# Patient Record
Sex: FEMALE | Race: WHITE | Employment: PART TIME | URBAN - METROPOLITAN AREA
[De-identification: names, ages, dates, MRNs, and addresses within clinical notes are randomized per-mention and may not be internally consistent; named-entity substitution may affect disease eponyms.]

---
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
## 2018-05-31 ENCOUNTER — OFFICE VISIT (OUTPATIENT)
Dept: FAMILY MEDICINE | Facility: CLINIC | Age: 18
End: 2018-05-31
Payer: COMMERCIAL

## 2018-05-31 VITALS — DIASTOLIC BLOOD PRESSURE: 66 MMHG | OXYGEN SATURATION: 98 % | HEART RATE: 56 BPM | SYSTOLIC BLOOD PRESSURE: 101 MMHG

## 2018-05-31 DIAGNOSIS — D22.62 MELANOCYTIC NEVUS OF LEFT UPPER EXTREMITY: ICD-10-CM

## 2018-05-31 DIAGNOSIS — B35.4 TINEA CORPORIS: Primary | ICD-10-CM

## 2018-05-31 PROCEDURE — 99214 OFFICE O/P EST MOD 30 MIN: CPT | Performed by: FAMILY MEDICINE

## 2018-05-31 NOTE — MR AVS SNAPSHOT
"              After Visit Summary   5/31/2018    Kelly Beckham    MRN: [id number]           Patient Information     Date Of Birth          2000        Visit Information        Provider Department      5/31/2018 10:00 AM Barbara Person MD [facility]        Today's Diagnoses     Tinea corporis    -  1    Melanocytic nevus of left upper extremity          Care Instructions    FUTURE APPOINTMENTS  Follow up as needed in 2 week(s) if the rash is not improving or if it is worsening.    TOPICAL ANTIFUNGAL  Apply OTC terbinafine (Lamisil) 1% cream generously (half an inch beyond the borders) two time(s) a day for 3 weeks to the affected areas.          Follow-ups after your visit        Who to contact     If you have questions or need follow up information about today's clinic visit or your schedule please contact [facility] directly at [phone].  Normal or non-critical lab and imaging results will be communicated to you by MyChart, letter or phone within 4 business days after the clinic has received the results. If you do not hear from us within 7 days, please contact the clinic through MyChart or phone. If you have a critical or abnormal lab result, we will notify you by phone as soon as possible.  Submit refill requests through [email or web address] or call your pharmacy and they will forward the refill request to us. Please allow 3 business days for your refill to be completed.          Additional Information About Your Visit        MyChart Information     [email or web address] lets you send messages to your doctor, view your test results, renew your prescriptions, schedule appointments and more. To sign up, go to [email or web address] . Click on \"Log in\" on the left side of the screen, which will take you to the Welcome page. Then click on \"Sign up Now\" on the right side of the page.     You will be asked to enter the access code listed below, as well as some personal information. " Please follow the directions to create your username and password.     Your access code is: [email or web address]  Expires: 2018 10:08 AM     Your access code will  in 90 days. If you need help or a new code, please call your [place] clinic or [phone].        Care EveryWhere ID     This is your Care EveryWhere ID. This could be used by other organizations to access your [facility] [place] medical records  [id number]        Your Vitals Were     Pulse Pulse Oximetry                56 98%           Blood Pressure from Last 3 Encounters:   18 101/66    Weight from Last 3 Encounters:   No data found for Wt              Today, you had the following     No orders found for display       Primary Care Provider Office Phone # Fax #    [facility] [phone]       [address] [place] [address]        Equal Access to Services     DEIDRA WALLACE : Hadii aad ku hadasho Soomaali, waaxda luqadaha, qaybta kaalmada adeegyada, kristal marcus hayaan geovanna simmons . So [facility] [phone].    ATENCIÓN: Si habla español, tiene a weiss disposición servicios gratuitos de asistencia lingüística. Llame al [phone].    We comply with applicable federal civil rights laws and [place] laws. We do not discriminate on the basis of race, color, national origin, age, disability, sex, sexual orientation, or gender identity.            Thank you!     Thank you for choosing [facility]  for your care. Our goal is always to provide you with excellent care. Hearing back from our patients is one way we can continue to improve our services. Please take a few minutes to complete the written survey that you may receive in the mail after your visit with us. Thank you!             Your Updated Medication List - Protect others around you: Learn how to safely use, store and throw away your medicines at www.disposemymeds.org.      Notice  As of 2018 10:08 AM    You have not been prescribed any  medications.

## 2018-05-31 NOTE — LETTER
5/31/2018         RE: Kelly Beckham  [address] [address]        Dear Colleague,    Thank you for referring your [facility], [facility]ham[facility]. Please see a copy of my visit note below.    [facility] - PRIMARY CARE SKIN    CC : Rash   SUBJECTIVE:   Kelly Beckham is a 17 year old female who presents to clinic today because of a(n) rash beginning May 15 in the left armpit. She denies any other areas of involvement.    Pruritic : YES.  Symptoms appear to be : stable.  Aggravating factors : dry skin exacerbates itchy sensations.  Relieving factors : none identified.    Previous similar history : No.  Recent exposure history : new skincare products - deodorant  Any other family members with similar symptoms : No.    Therapies tried : None.  Products used : She has used a new deodorant prior to the onset of rash.    Issue Two : Mole on left upper arm.  Onset : present since childhood.    Personal Medical History  Skin Cancer : NO  Eczema Psoriasis Rosacea Autoimmune   NO NO NO NO     Family Medical History  Skin Cancer : NO  Eczema Psoriasis Rosacea Autoimmune   NO NO NO NO     Occupation : student (indoor).    Patient Active Problem List   Diagnosis     Low back pain       No past medical history on file. No past surgical history on file.   Social History   Substance Use Topics     Smoking status: Never Smoker     Smokeless tobacco: Never Used     Alcohol use Not on file             No Known Allergies     INTEGUMENTARY/SKIN: POSITIVE for mole changes and rash    ROS : 14 point review of systems was negative except the symptoms listed above in the HPI.    This document serves as a record of the services and decisions personally performed and made by Danna Person MD. It was created on her behalf by Austin Akhtar, a trained medical scribe.  The creation of this document is based on the scribe's personal observations and the provider's statements to the  medical scribe.  Austin Akhtar, May 31, 2018 9:59 AM      OBJECTIVE:   GENERAL: healthy, alert and no distress  SKIN: Deluna Skin Type - II.  Trunk and Arms were examined. The dermatoscope was used to help evaluate pigmented lesions.  Skin Pertinent Findings:  Left axilla : 4 cm x 8 mm linear erythematous patch  Left medial upper arm : 3 mm in size light brown macule(s) most consistent with benign nevus(i) with symmetrical central hypopigmentation.    Diagnostic Test Results:  none     MDM : based on the clinical appearance this looks most consistent with fungal etiology , however cannot rule out an irritant dermatitis secondary to the new deodorant.If antifungal does not clear the rash then consider desonide 0.05% cream .      ASSESSMENT:     Encounter Diagnoses   Name Primary?     Tinea corporis Yes     Melanocytic nevus of left upper extremity          PLAN:   Patient Instructions   FUTURE APPOINTMENTS  Follow up as needed in 2 week(s) if the rash is not improving or if it is worsening.    TOPICAL ANTIFUNGAL  Apply OTC terbinafine (Lamisil) 1% cream generously (half an inch beyond the borders) two time(s) a day for 3 weeks to the affected areas.        PROCEDURES:   None.    TT : 20 minutes.  CT : 15 minutes.      The information in this document, created by the medical scribe for me, accurately reflects the services I personally performed and the decisions made by me. I have reviewed and approved this document for accuracy prior to leaving the patient care area.  Danna Person MD May 31, 2018 9:59 AM  [facility]    Again, thank you for allowing me to participate in the care of your patient.        Sincerely,        Barbara Person MD

## 2018-05-31 NOTE — PATIENT INSTRUCTIONS
FUTURE APPOINTMENTS  Follow up as needed in 2 week(s) if the rash is not improving or if it is worsening.    TOPICAL ANTIFUNGAL  Apply OTC terbinafine (Lamisil) 1% cream generously (half an inch beyond the borders) two time(s) a day for 3 weeks to the affected areas.

## 2018-05-31 NOTE — PROGRESS NOTES
[facility] - PRIMARY CARE SKIN    CC : Rash   SUBJECTIVE:   Kelly Beckham is a 17 year old female who presents to clinic today because of a(n) rash beginning May 15 in the left armpit. She denies any other areas of involvement.    Pruritic : YES.  Symptoms appear to be : stable.  Aggravating factors : dry skin exacerbates itchy sensations.  Relieving factors : none identified.    Previous similar history : No.  Recent exposure history : new skincare products - deodorant  Any other family members with similar symptoms : No.    Therapies tried : None.  Products used : She has used a new deodorant prior to the onset of rash.    Issue Two : Mole on left upper arm.  Onset : present since childhood.    Personal Medical History  Skin Cancer : NO  Eczema Psoriasis Rosacea Autoimmune   NO NO NO NO     Family Medical History  Skin Cancer : NO  Eczema Psoriasis Rosacea Autoimmune   NO NO NO NO     Occupation : student (indoor).    Patient Active Problem List   Diagnosis     Low back pain       No past medical history on file. No past surgical history on file.   Social History   Substance Use Topics     Smoking status: Never Smoker     Smokeless tobacco: Never Used     Alcohol use Not on file             No Known Allergies     INTEGUMENTARY/SKIN: POSITIVE for mole changes and rash    ROS : 14 point review of systems was negative except the symptoms listed above in the HPI.    This document serves as a record of the services and decisions personally performed and made by Danna Person MD. It was created on her behalf by Austin Akhtar, a trained medical scribe.  The creation of this document is based on the scribe's personal observations and the provider's statements to the medical scribe.  Austin Akhtar, May 31, 2018 9:59 AM      OBJECTIVE:   GENERAL: healthy, alert and no distress  SKIN: Deluna Skin Type - II.  Trunk and Arms were examined. The dermatoscope was used to help evaluate pigmented lesions.  Skin Pertinent  Findings:  Left axilla : 4 cm x 8 mm linear erythematous patch  Left medial upper arm : 3 mm in size light brown macule(s) most consistent with benign nevus(i) with symmetrical central hypopigmentation.    Diagnostic Test Results:  none     MDM : based on the clinical appearance this looks most consistent with fungal etiology , however cannot rule out an irritant dermatitis secondary to the new deodorant.If antifungal does not clear the rash then consider desonide 0.05% cream .      ASSESSMENT:     Encounter Diagnoses   Name Primary?     Tinea corporis Yes     Melanocytic nevus of left upper extremity          PLAN:   Patient Instructions   FUTURE APPOINTMENTS  Follow up as needed in 2 week(s) if the rash is not improving or if it is worsening.    TOPICAL ANTIFUNGAL  Apply OTC terbinafine (Lamisil) 1% cream generously (half an inch beyond the borders) two time(s) a day for 3 weeks to the affected areas.        PROCEDURES:   None.    TT : 20 minutes.  CT : 15 minutes.      The information in this document, created by the medical scribe for me, accurately reflects the services I personally performed and the decisions made by me. I have reviewed and approved this document for accuracy prior to leaving the patient care area.  Danna Person MD May 31, 2018 9:59 AM  [facility]

## 2020-01-29 ENCOUNTER — OFFICE VISIT (OUTPATIENT)
Dept: DERMATOLOGY | Facility: CLINIC | Age: 20
End: 2020-01-29
Payer: COMMERCIAL

## 2020-01-29 VITALS — OXYGEN SATURATION: 100 % | DIASTOLIC BLOOD PRESSURE: 67 MMHG | HEART RATE: 60 BPM | SYSTOLIC BLOOD PRESSURE: 105 MMHG

## 2020-01-29 DIAGNOSIS — L82.0 INFLAMED SEBORRHEIC KERATOSIS: Primary | ICD-10-CM

## 2020-01-29 PROCEDURE — 99203 OFFICE O/P NEW LOW 30 MIN: CPT | Mod: 25 | Performed by: PHYSICIAN ASSISTANT

## 2020-01-29 PROCEDURE — 17110 DESTRUCTION B9 LES UP TO 14: CPT | Performed by: PHYSICIAN ASSISTANT

## 2020-01-29 NOTE — PATIENT INSTRUCTIONS
Proper skin care from [facility]:    -Eliminate harsh soaps as they strip the natural oils from the skin, often resulting in dry itchy skin ( i.e. Dial, Zest, Ritu Spring)  -Use mild soaps such as Cetaphil or Dove Sensitive Skin in the shower. You do not need to use soap on arms, legs, and trunk every time you shower unless visibly soiled.   -Avoid hot or cold showers.  -After showering, lightly dry off and apply moisturizing within 2-3 minutes. This will help trap moisture in the skin.   -Aggressive use of a moisturizer at least 1-2 times a day to the entire body (including -Vanicream, Cetaphil, Aquaphor or Cerave) and moisturize hands after every washing.  -We recommend using moisturizers that come in a tub that needs to be scooped out, not a pump. This has more of an oil base. It will hold moisture in your skin much better than a water base moisturizer. The above recommended are non-pore clogging.      Wear a sunscreen with at least SPF 30 on your face, ears, neck and V of the chest daily. Wear sunscreen on other areas of the body if those areas are exposed to the sun throughout the day. Sunscreens can contain physical and/or chemical blockers. Physical blockers are less likely to clog pores, these include zinc oxide and titanium dioxide. Reapply every two hour and after swimming. Sunscreen examples include Neutrogena, CeraVe, Blue Lizard, Elta MD and many others.    UV radiation  UVA radiation remains constant throughout the day and throughout the year. It is a longer wavelength than UVB and therefore penetrates deeper into the skin leading to immediate and delayed tanning, photoaging, and skin cancer. 70-80% of UVA and UVB radiation occurs between the hours of 10am-2pm.  UVB radiation  UVB radiation causes the most harmful effects and is more significant during the summer months. However, snow and ice can reflect UVB radiation leading to skin damage during the winter months as well. UVB radiation is  responsible for tanning, burning, inflammation, delayed erythema (pinkness), pigmentation (brown spots), and skin cancer.       WOUND CARE INSTRUCTIONS  FOR CRYOSURGERY        This area treated with liquid nitrogen will form a blister. You do not need to bandage the area until after the blister forms and breaks (which may be a few days).  When the blister breaks, begin daily dressing changes as follows:    1) Clean and dry the area with tap water using clean Q-tip or sterile gauze pad.    2) Apply Polysporin ointment or Bacitracin ointment over entire wound.  Do NOT use Neosporin ointment.    3) Cover the wound with a band-aid or sterile non-stick gauze pad and micropore paper tape.      REPEAT THESE INSTRUCTIONS AT LEAST ONCE A DAY UNTIL THE WOUND HAS COMPLETELY HEALED.        It is an old wives tale that a wound heals better when it is exposed to air and allowed to dry out. The wound will heal faster with a better cosmetic result if it is kept moist with ointment and covered with a bandage.  Do not let the wound dry out.      Supplies Needed:     *Cotton tipped applicators (Q-tips)   *Polysporin ointment or Bacitracin ointment (NOT NEOSPORIN)   *Band-aids, or non stick gauze pads and micropore paper tape    PATIENT INFORMATION    During the healing process you will notice a number of changes. All wounds develop a small halo of redness surrounding the wound.  This means healing is occurring. Severe itching with extensive redness usually indicates sensitivity to the ointment or bandage tape used to dress the wound.  You should call our office if this develops.      Swelling and/or discoloration around your surgical site is common, particularly when performed around the eye.    All wounds normally drain.  The larger the wound the more drainage there will be.  After 7-10 days, you will notice the wound beginning to shrink and new skin will begin to grow.  The wound is healed when you can see skin has formed over the  entire area.  A healed wound has a healthy, shiny look to the surface and is red to dark pink in color to normalize.  Wounds may take approximately 4-6 weeks to heal.  Larger wounds may take 6-8 weeks.  After the wound is healed you may discontinue dressing changes.    You may experience a sensation of tightness as your wound heals. This is normal and will gradually subside.    Your healed wound may be sensitive to temperature changes. This sensitivity improves with time, but if you re having a lot of discomfort, try to avoid temperature extremes.    Patients frequently experience itching after their wound appears to have healed because of the continue healing under the skin.  Plain Vaseline will help relieve the itching.

## 2020-01-29 NOTE — LETTER
1/29/2020         RE: Kelly Beckham  [address] [id number]        Dear Colleague,    Thank you for referring your patient, Kelly Beckham, to the [facility]. Please see a copy of my visit note below.    HPI:  Kelly Beckham is a 19 year old female patient here today for growth on forehead .  Patient states this has been present for a short while.  Patient reports the following symptoms: bothersome, growing .  Patient reports the following previous treatments: none.  Patient reports the following modifying factors: none.  Associated symptoms: none.  Patient has no other skin complaints today.  Remainder of the HPI, Meds, PMH, Allergies, FH, and SH was reviewed in chart.    Pertinent Hx:   No personal or family history of skin cancer    History reviewed. No pertinent past medical history.    History reviewed. No pertinent surgical history.     History reviewed. No pertinent family history.    Social History     Socioeconomic History     Marital status: Single     Spouse name: Not on file     Number of children: Not on file     Years of education: Not on file     Highest education level: Not on file   Occupational History     Not on file   Social Needs     Financial resource strain: Not on file     Food insecurity:     Worry: Not on file     Inability: Not on file     Transportation needs:     Medical: Not on file     Non-medical: Not on file   Tobacco Use     Smoking status: Never Smoker     Smokeless tobacco: Never Used   Substance and Sexual Activity     Alcohol use: Not on file     Drug use: Not on file     Sexual activity: Not on file   Lifestyle     Physical activity:     Days per week: Not on file     Minutes per session: Not on file     Stress: Not on file   Relationships     Social connections:     Talks on phone: Not on file     Gets together: Not on file     Attends [employer] service: Not on file     Active member of club or organization: Not on file      Attends meetings of clubs or organizations: Not on file     Relationship status: Not on file     Intimate partner violence:     Fear of current or ex partner: Not on file     Emotionally abused: Not on file     Physically abused: Not on file     Forced sexual activity: Not on file   Other Topics Concern     Not on file   Social History Narrative     Not on file       No outpatient encounter medications on file as of 1/29/2020.     No facility-administered encounter medications on file as of 1/29/2020.        Review Of Systems:  Skin: spot on forhead  Eyes: negative  Ears/Nose/Throat: negative  Respiratory: No shortness of breath, dyspnea on exertion, cough, or hemoptysis  Cardiovascular: negative  Gastrointestinal: negative  Genitourinary: negative  Musculoskeletal: negative  Neurologic: negative  Psychiatric: negative  Hematologic/Lymphatic/Immunologic: negative  Endocrine: negative      Objective:     /67   Pulse 60   SpO2 100%   Breastfeeding No   Eyes: Conjunctivae/lids: Normal   ENT: Lips:  Normal  MSK: Normal  Cardiovascular: Peripheral edema none  Pulm: Breathing Normal  Neuro/Psych: Orientation: A/O x 3 Normal; Mood/Affect: Normal, NAD, WDWN  Pt accompanied by: self  Following areas examined: face, neck, hands  Deluna skin type:ii   Findings:  Pink verrucous appearing stuck on papule on right forehead  Assessment and Plan:  1) ISK vs wart  Disc cryo and tangential removal. Pt elects cryo.   Benign etiology and course of lesion.  LN2: Treated with LN2 for 5s for 1-2 cycles. Warned risks of blistering, pain, pigment change, scarring, and incomplete resolution.  Advised patient to return if lesions do not completely resolve within 2-3 months.  Wound care sheet given.    Follow up in 1 month if not resolved.       Again, thank you for allowing me to participate in the care of your patient.        Sincerely,        Melinda Miranda PA-C

## 2020-01-29 NOTE — PROGRESS NOTES
HPI:  Kelly Beckham is a 19 year old female patient here today for growth on forehead .  Patient states this has been present for a short while.  Patient reports the following symptoms: bothersome, growing .  Patient reports the following previous treatments: none.  Patient reports the following modifying factors: none.  Associated symptoms: none.  Patient has no other skin complaints today.  Remainder of the HPI, Meds, PMH, Allergies, FH, and SH was reviewed in chart.    Pertinent Hx:   No personal or family history of skin cancer    History reviewed. No pertinent past medical history.    History reviewed. No pertinent surgical history.     History reviewed. No pertinent family history.    Social History     Socioeconomic History     Marital status: Single     Spouse name: Not on file     Number of children: Not on file     Years of education: Not on file     Highest education level: Not on file   Occupational History     Not on file   Social Needs     Financial resource strain: Not on file     Food insecurity:     Worry: Not on file     Inability: Not on file     Transportation needs:     Medical: Not on file     Non-medical: Not on file   Tobacco Use     Smoking status: Never Smoker     Smokeless tobacco: Never Used   Substance and Sexual Activity     Alcohol use: Not on file     Drug use: Not on file     Sexual activity: Not on file   Lifestyle     Physical activity:     Days per week: Not on file     Minutes per session: Not on file     Stress: Not on file   Relationships     Social connections:     Talks on phone: Not on file     Gets together: Not on file     Attends [employer] service: Not on file     Active member of club or organization: Not on file     Attends meetings of clubs or organizations: Not on file     Relationship status: Not on file     Intimate partner violence:     Fear of current or ex partner: Not on file     Emotionally abused: Not on file     Physically abused: Not on file     Forced  sexual activity: Not on file   Other Topics Concern     Not on file   Social History Narrative     Not on file       No outpatient encounter medications on file as of 1/29/2020.     No facility-administered encounter medications on file as of 1/29/2020.        Review Of Systems:  Skin: spot on forhead  Eyes: negative  Ears/Nose/Throat: negative  Respiratory: No shortness of breath, dyspnea on exertion, cough, or hemoptysis  Cardiovascular: negative  Gastrointestinal: negative  Genitourinary: negative  Musculoskeletal: negative  Neurologic: negative  Psychiatric: negative  Hematologic/Lymphatic/Immunologic: negative  Endocrine: negative      Objective:     /67   Pulse 60   SpO2 100%   Breastfeeding No   Eyes: Conjunctivae/lids: Normal   ENT: Lips:  Normal  MSK: Normal  Cardiovascular: Peripheral edema none  Pulm: Breathing Normal  Neuro/Psych: Orientation: A/O x 3 Normal; Mood/Affect: Normal, NAD, WDWN  Pt accompanied by: self  Following areas examined: face, neck, hands  Deluna skin type:ii   Findings:  Pink verrucous appearing stuck on papule on right forehead  Assessment and Plan:  1) ISK vs wart  Disc cryo and tangential removal. Pt elects cryo.   Benign etiology and course of lesion.  LN2: Treated with LN2 for 5s for 1-2 cycles. Warned risks of blistering, pain, pigment change, scarring, and incomplete resolution.  Advised patient to return if lesions do not completely resolve within 2-3 months.  Wound care sheet given.    Follow up in 1 month if not resolved.